# Patient Record
Sex: FEMALE | HISPANIC OR LATINO
[De-identification: names, ages, dates, MRNs, and addresses within clinical notes are randomized per-mention and may not be internally consistent; named-entity substitution may affect disease eponyms.]

---

## 2017-05-15 ENCOUNTER — APPOINTMENT (OUTPATIENT)
Dept: OBGYN | Facility: CLINIC | Age: 52
End: 2017-05-15

## 2017-05-15 VITALS
WEIGHT: 144 LBS | HEIGHT: 60 IN | SYSTOLIC BLOOD PRESSURE: 106 MMHG | DIASTOLIC BLOOD PRESSURE: 69 MMHG | BODY MASS INDEX: 28.27 KG/M2

## 2017-05-15 DIAGNOSIS — Z82.49 FAMILY HISTORY OF ISCHEMIC HEART DISEASE AND OTHER DISEASES OF THE CIRCULATORY SYSTEM: ICD-10-CM

## 2017-05-15 LAB
HCG UR QL: NEGATIVE
QUALITY CONTROL: YES

## 2017-05-16 LAB
CANDIDA VAG CYTO: DETECTED
G VAGINALIS+PREV SP MTYP VAG QL MICRO: DETECTED
HPV HIGH+LOW RISK DNA PNL CVX: NEGATIVE
T VAGINALIS VAG QL WET PREP: NOT DETECTED

## 2017-05-19 LAB
BACTERIA GENITAL AEROBE CULT: ABNORMAL
CYTOLOGY CVX/VAG DOC THIN PREP: NORMAL

## 2018-05-21 ENCOUNTER — APPOINTMENT (OUTPATIENT)
Dept: OBGYN | Facility: CLINIC | Age: 53
End: 2018-05-21
Payer: COMMERCIAL

## 2018-05-21 VITALS
BODY MASS INDEX: 27.71 KG/M2 | SYSTOLIC BLOOD PRESSURE: 90 MMHG | WEIGHT: 141.13 LBS | HEIGHT: 60 IN | DIASTOLIC BLOOD PRESSURE: 60 MMHG

## 2018-05-21 DIAGNOSIS — N76.0 ACUTE VAGINITIS: ICD-10-CM

## 2018-05-21 PROCEDURE — 99396 PREV VISIT EST AGE 40-64: CPT

## 2018-05-21 RX ORDER — TERCONAZOLE 4 MG/G
0.4 CREAM VAGINAL
Qty: 1 | Refills: 1 | Status: COMPLETED | COMMUNITY
Start: 2017-05-15 | End: 2018-05-21

## 2018-05-22 LAB — HPV HIGH+LOW RISK DNA PNL CVX: NOT DETECTED

## 2018-05-29 LAB — CYTOLOGY CVX/VAG DOC THIN PREP: NORMAL

## 2019-04-03 ENCOUNTER — OTHER (OUTPATIENT)
Age: 54
End: 2019-04-03

## 2019-05-21 ENCOUNTER — CHART COPY (OUTPATIENT)
Age: 54
End: 2019-05-21

## 2019-06-24 ENCOUNTER — APPOINTMENT (OUTPATIENT)
Dept: OBGYN | Facility: CLINIC | Age: 54
End: 2019-06-24
Payer: COMMERCIAL

## 2019-06-24 VITALS
SYSTOLIC BLOOD PRESSURE: 104 MMHG | HEART RATE: 58 BPM | BODY MASS INDEX: 28.86 KG/M2 | HEIGHT: 60 IN | DIASTOLIC BLOOD PRESSURE: 61 MMHG | WEIGHT: 147 LBS

## 2019-06-24 PROCEDURE — 99396 PREV VISIT EST AGE 40-64: CPT

## 2019-06-24 RX ORDER — ESTRADIOL 0.1 MG/G
0.1 CREAM VAGINAL
Qty: 1 | Refills: 3 | Status: ACTIVE | COMMUNITY
Start: 2019-06-24 | End: 1900-01-01

## 2019-06-24 NOTE — PHYSICAL EXAM
[Awake] : awake [Alert] : alert [Acute Distress] : no acute distress [Thyroid Nodule] : no thyroid nodule [LAD] : no lymphadenopathy [Goiter] : no goiter [Mass] : no breast mass [Nipple Discharge] : no nipple discharge [Axillary LAD] : no axillary lymphadenopathy [Tender] : non tender [Soft] : soft [Distended] : not distended [Oriented x3] : oriented to person, place, and time [H/Smegaly] : no hepatosplenomegaly [Flat Affect] : affect not flat [Depressed Mood] : not depressed [Normal] : cervix [No Bleeding] : there was no active vaginal bleeding [Anteversion] : anteverted [Tenderness] : nontender [RRR, No Murmurs] : RRR, no murmurs [Uterine Adnexae] : were not tender and not enlarged [CTAB] : CTAB

## 2019-06-24 NOTE — CHIEF COMPLAINT
[Annual Visit] : annual visit [FreeTextEntry1] : 54YO P2 LMP 1/1/19  11/3/18 presents for checkup with occasional sweats. No other complaints except vaginal dryness.

## 2019-06-24 NOTE — HISTORY OF PRESENT ILLNESS
[1 Year Ago] : 1 year ago [Good] : being in good health [Healthy Diet] : a healthy diet [Regular Exercise] : regular exercise [Last Mammogram ___] : Last Mammogram was [unfilled] [Last Colonoscopy ___] : Last colonoscopy [unfilled] [Last Pap ___] : Last cervical pap smear was [unfilled] [Perimenopausal] : is perimenopausal [Pregnancy History] : pregnancy history: [Up to Date] : up to date with ~his/her~ immunizations [Menstrual Problems] : reports normal menses [Weight Concerns] : no concerns with her weight

## 2019-06-26 LAB — HPV HIGH+LOW RISK DNA PNL CVX: NOT DETECTED

## 2019-06-28 LAB — CYTOLOGY CVX/VAG DOC THIN PREP: NORMAL

## 2020-05-07 ENCOUNTER — TRANSCRIPTION ENCOUNTER (OUTPATIENT)
Age: 55
End: 2020-05-07

## 2020-11-06 ENCOUNTER — TRANSCRIPTION ENCOUNTER (OUTPATIENT)
Age: 55
End: 2020-11-06

## 2020-12-07 ENCOUNTER — TRANSCRIPTION ENCOUNTER (OUTPATIENT)
Age: 55
End: 2020-12-07

## 2020-12-11 ENCOUNTER — APPOINTMENT (OUTPATIENT)
Dept: OBGYN | Facility: CLINIC | Age: 55
End: 2020-12-11
Payer: COMMERCIAL

## 2020-12-11 VITALS
BODY MASS INDEX: 27.68 KG/M2 | HEIGHT: 60 IN | DIASTOLIC BLOOD PRESSURE: 57 MMHG | WEIGHT: 141 LBS | TEMPERATURE: 97.7 F | HEART RATE: 56 BPM | SYSTOLIC BLOOD PRESSURE: 101 MMHG

## 2020-12-11 DIAGNOSIS — N39.0 URINARY TRACT INFECTION, SITE NOT SPECIFIED: ICD-10-CM

## 2020-12-11 DIAGNOSIS — Z87.42 PERSONAL HISTORY OF OTHER DISEASES OF THE FEMALE GENITAL TRACT: ICD-10-CM

## 2020-12-11 PROCEDURE — 99396 PREV VISIT EST AGE 40-64: CPT

## 2020-12-11 PROCEDURE — 99072 ADDL SUPL MATRL&STAF TM PHE: CPT

## 2020-12-11 NOTE — HISTORY OF PRESENT ILLNESS
[Patient reported mammogram was normal] : Patient reported mammogram was normal [Patient reported breast sonogram was normal] : Patient reported breast sonogram was normal [Patient reported PAP Smear was normal] : Patient reported PAP Smear was normal [Patient reported colonoscopy was normal] : Patient reported colonoscopy was normal [FreeTextEntry1] : 56YO P2 LMP 1/19 had 1 episode of spotting over last year and a half, none since, noting flashing and dry vagina. [Mammogramdate] : 9/20 [BreastSonogramDate] : 9/20 [PapSmeardate] : 7/19 [ColonoscopyDate] : 2017

## 2020-12-14 ENCOUNTER — TRANSCRIPTION ENCOUNTER (OUTPATIENT)
Age: 55
End: 2020-12-14

## 2020-12-14 LAB — HPV HIGH+LOW RISK DNA PNL CVX: NOT DETECTED

## 2020-12-18 LAB — CYTOLOGY CVX/VAG DOC THIN PREP: ABNORMAL

## 2021-01-27 ENCOUNTER — TRANSCRIPTION ENCOUNTER (OUTPATIENT)
Age: 56
End: 2021-01-27

## 2021-02-11 ENCOUNTER — NON-APPOINTMENT (OUTPATIENT)
Age: 56
End: 2021-02-11

## 2021-04-26 ENCOUNTER — APPOINTMENT (OUTPATIENT)
Dept: OBGYN | Facility: CLINIC | Age: 56
End: 2021-04-26

## 2021-04-26 LAB
BILIRUB UR QL STRIP: NORMAL
CLARITY UR: NORMAL
COLLECTION METHOD: NORMAL
GLUCOSE UR-MCNC: NORMAL
HCG UR QL: 0.2 EU/DL
HGB UR QL STRIP.AUTO: ABNORMAL
KETONES UR-MCNC: NORMAL
LEUKOCYTE ESTERASE UR QL STRIP: ABNORMAL
NITRITE UR QL STRIP: NEGATIVE
PH UR STRIP: 7
PROT UR STRIP-MCNC: NEGATIVE
SP GR UR STRIP: 1.01

## 2021-04-30 LAB — BACTERIA UR CULT: ABNORMAL

## 2021-12-23 ENCOUNTER — APPOINTMENT (OUTPATIENT)
Dept: OBGYN | Facility: CLINIC | Age: 56
End: 2021-12-23
Payer: COMMERCIAL

## 2021-12-23 VITALS
HEIGHT: 60 IN | WEIGHT: 146 LBS | SYSTOLIC BLOOD PRESSURE: 102 MMHG | BODY MASS INDEX: 28.66 KG/M2 | DIASTOLIC BLOOD PRESSURE: 68 MMHG

## 2021-12-23 DIAGNOSIS — N95.1 MENOPAUSAL AND FEMALE CLIMACTERIC STATES: ICD-10-CM

## 2021-12-23 DIAGNOSIS — R39.9 UNSPECIFIED SYMPTOMS AND SIGNS INVOLVING THE GENITOURINARY SYSTEM: ICD-10-CM

## 2021-12-23 PROCEDURE — 99396 PREV VISIT EST AGE 40-64: CPT

## 2021-12-23 RX ORDER — NITROFURANTOIN (MONOHYDRATE/MACROCRYSTALS) 25; 75 MG/1; MG/1
100 CAPSULE ORAL
Qty: 14 | Refills: 0 | Status: COMPLETED | COMMUNITY
Start: 2021-02-11 | End: 2021-12-23

## 2021-12-23 NOTE — HISTORY OF PRESENT ILLNESS
[Patient reported mammogram was normal] : Patient reported mammogram was normal [Patient reported breast sonogram was normal] : Patient reported breast sonogram was normal [Patient reported PAP Smear was normal] : Patient reported PAP Smear was normal [FreeTextEntry1] : 57YO P2 LMP 1/2019 noting finger arthritis, worse in summer, o/w no complaints. [Mammogramdate] : 11/21 [BreastSonogramDate] : 11/21 [PapSmeardate] : 12/20 [ColonoscopyDate] : 11/21

## 2021-12-27 LAB — HPV HIGH+LOW RISK DNA PNL CVX: NOT DETECTED

## 2022-01-03 LAB — CYTOLOGY CVX/VAG DOC THIN PREP: NORMAL

## 2022-12-30 ENCOUNTER — EMERGENCY (EMERGENCY)
Facility: HOSPITAL | Age: 57
LOS: 0 days | Discharge: HOME | End: 2022-12-30
Attending: EMERGENCY MEDICINE
Payer: COMMERCIAL

## 2022-12-30 VITALS
OXYGEN SATURATION: 97 % | HEART RATE: 64 BPM | TEMPERATURE: 98 F | SYSTOLIC BLOOD PRESSURE: 114 MMHG | RESPIRATION RATE: 14 BRPM | DIASTOLIC BLOOD PRESSURE: 58 MMHG

## 2022-12-30 VITALS — WEIGHT: 151.02 LBS | HEIGHT: 62 IN

## 2022-12-30 DIAGNOSIS — N28.1 CYST OF KIDNEY, ACQUIRED: ICD-10-CM

## 2022-12-30 DIAGNOSIS — K52.9 NONINFECTIVE GASTROENTERITIS AND COLITIS, UNSPECIFIED: ICD-10-CM

## 2022-12-30 DIAGNOSIS — R10.9 UNSPECIFIED ABDOMINAL PAIN: ICD-10-CM

## 2022-12-30 DIAGNOSIS — N85.9 NONINFLAMMATORY DISORDER OF UTERUS, UNSPECIFIED: ICD-10-CM

## 2022-12-30 DIAGNOSIS — R19.7 DIARRHEA, UNSPECIFIED: ICD-10-CM

## 2022-12-30 DIAGNOSIS — R14.0 ABDOMINAL DISTENSION (GASEOUS): ICD-10-CM

## 2022-12-30 DIAGNOSIS — K76.89 OTHER SPECIFIED DISEASES OF LIVER: ICD-10-CM

## 2022-12-30 LAB
ALBUMIN SERPL ELPH-MCNC: 4.3 G/DL — SIGNIFICANT CHANGE UP (ref 3.3–5)
ALP SERPL-CCNC: 143 U/L — HIGH (ref 40–120)
ALT FLD-CCNC: 37 U/L — SIGNIFICANT CHANGE UP (ref 12–78)
ANION GAP SERPL CALC-SCNC: 6 MMOL/L — SIGNIFICANT CHANGE UP (ref 5–17)
AST SERPL-CCNC: 28 U/L — SIGNIFICANT CHANGE UP (ref 15–37)
BASOPHILS # BLD AUTO: 0.02 K/UL — SIGNIFICANT CHANGE UP (ref 0–0.2)
BASOPHILS NFR BLD AUTO: 0.2 % — SIGNIFICANT CHANGE UP (ref 0–2)
BILIRUB SERPL-MCNC: 0.6 MG/DL — SIGNIFICANT CHANGE UP (ref 0.2–1.2)
BUN SERPL-MCNC: 16 MG/DL — SIGNIFICANT CHANGE UP (ref 7–23)
CALCIUM SERPL-MCNC: 9.9 MG/DL — SIGNIFICANT CHANGE UP (ref 8.5–10.1)
CHLORIDE SERPL-SCNC: 104 MMOL/L — SIGNIFICANT CHANGE UP (ref 96–108)
CO2 SERPL-SCNC: 29 MMOL/L — SIGNIFICANT CHANGE UP (ref 22–31)
CREAT SERPL-MCNC: 0.7 MG/DL — SIGNIFICANT CHANGE UP (ref 0.5–1.3)
EGFR: 101 ML/MIN/1.73M2 — SIGNIFICANT CHANGE UP
EOSINOPHIL # BLD AUTO: 0.01 K/UL — SIGNIFICANT CHANGE UP (ref 0–0.5)
EOSINOPHIL NFR BLD AUTO: 0.1 % — SIGNIFICANT CHANGE UP (ref 0–6)
GLUCOSE SERPL-MCNC: 116 MG/DL — HIGH (ref 70–99)
HCT VFR BLD CALC: 44.5 % — SIGNIFICANT CHANGE UP (ref 34.5–45)
HGB BLD-MCNC: 14.9 G/DL — SIGNIFICANT CHANGE UP (ref 11.5–15.5)
IMM GRANULOCYTES NFR BLD AUTO: 0.5 % — SIGNIFICANT CHANGE UP (ref 0–0.9)
LIDOCAIN IGE QN: 103 U/L — SIGNIFICANT CHANGE UP (ref 73–393)
LYMPHOCYTES # BLD AUTO: 1.03 K/UL — SIGNIFICANT CHANGE UP (ref 1–3.3)
LYMPHOCYTES # BLD AUTO: 7.8 % — LOW (ref 13–44)
MCHC RBC-ENTMCNC: 29.9 PG — SIGNIFICANT CHANGE UP (ref 27–34)
MCHC RBC-ENTMCNC: 33.5 GM/DL — SIGNIFICANT CHANGE UP (ref 32–36)
MCV RBC AUTO: 89.4 FL — SIGNIFICANT CHANGE UP (ref 80–100)
MONOCYTES # BLD AUTO: 0.28 K/UL — SIGNIFICANT CHANGE UP (ref 0–0.9)
MONOCYTES NFR BLD AUTO: 2.1 % — SIGNIFICANT CHANGE UP (ref 2–14)
NEUTROPHILS # BLD AUTO: 11.77 K/UL — HIGH (ref 1.8–7.4)
NEUTROPHILS NFR BLD AUTO: 89.3 % — HIGH (ref 43–77)
PLATELET # BLD AUTO: 309 K/UL — SIGNIFICANT CHANGE UP (ref 150–400)
POTASSIUM SERPL-MCNC: 3.8 MMOL/L — SIGNIFICANT CHANGE UP (ref 3.5–5.3)
POTASSIUM SERPL-SCNC: 3.8 MMOL/L — SIGNIFICANT CHANGE UP (ref 3.5–5.3)
PROT SERPL-MCNC: 8.9 GM/DL — HIGH (ref 6–8.3)
RBC # BLD: 4.98 M/UL — SIGNIFICANT CHANGE UP (ref 3.8–5.2)
RBC # FLD: 13.2 % — SIGNIFICANT CHANGE UP (ref 10.3–14.5)
SODIUM SERPL-SCNC: 139 MMOL/L — SIGNIFICANT CHANGE UP (ref 135–145)
WBC # BLD: 13.17 K/UL — HIGH (ref 3.8–10.5)
WBC # FLD AUTO: 13.17 K/UL — HIGH (ref 3.8–10.5)

## 2022-12-30 PROCEDURE — 74177 CT ABD & PELVIS W/CONTRAST: CPT | Mod: MA

## 2022-12-30 PROCEDURE — 96374 THER/PROPH/DIAG INJ IV PUSH: CPT

## 2022-12-30 PROCEDURE — 83690 ASSAY OF LIPASE: CPT

## 2022-12-30 PROCEDURE — 85025 COMPLETE CBC W/AUTO DIFF WBC: CPT

## 2022-12-30 PROCEDURE — 80053 COMPREHEN METABOLIC PANEL: CPT

## 2022-12-30 PROCEDURE — 36415 COLL VENOUS BLD VENIPUNCTURE: CPT

## 2022-12-30 PROCEDURE — 99285 EMERGENCY DEPT VISIT HI MDM: CPT

## 2022-12-30 PROCEDURE — 99284 EMERGENCY DEPT VISIT MOD MDM: CPT | Mod: 25

## 2022-12-30 PROCEDURE — 74177 CT ABD & PELVIS W/CONTRAST: CPT | Mod: 26,MA

## 2022-12-30 RX ORDER — ONDANSETRON 8 MG/1
4 TABLET, FILM COATED ORAL ONCE
Refills: 0 | Status: COMPLETED | OUTPATIENT
Start: 2022-12-30 | End: 2022-12-30

## 2022-12-30 RX ORDER — METRONIDAZOLE 500 MG
500 TABLET ORAL ONCE
Refills: 0 | Status: COMPLETED | OUTPATIENT
Start: 2022-12-30 | End: 2022-12-30

## 2022-12-30 RX ORDER — METRONIDAZOLE 500 MG
1 TABLET ORAL
Qty: 21 | Refills: 0
Start: 2022-12-30 | End: 2023-01-05

## 2022-12-30 RX ORDER — CIPROFLOXACIN LACTATE 400MG/40ML
1 VIAL (ML) INTRAVENOUS
Qty: 14 | Refills: 0
Start: 2022-12-30 | End: 2023-01-05

## 2022-12-30 RX ORDER — CIPROFLOXACIN LACTATE 400MG/40ML
500 VIAL (ML) INTRAVENOUS ONCE
Refills: 0 | Status: COMPLETED | OUTPATIENT
Start: 2022-12-30 | End: 2022-12-30

## 2022-12-30 RX ORDER — SODIUM CHLORIDE 9 MG/ML
1000 INJECTION INTRAMUSCULAR; INTRAVENOUS; SUBCUTANEOUS ONCE
Refills: 0 | Status: COMPLETED | OUTPATIENT
Start: 2022-12-30 | End: 2022-12-30

## 2022-12-30 RX ADMIN — SODIUM CHLORIDE 1000 MILLILITER(S): 9 INJECTION INTRAMUSCULAR; INTRAVENOUS; SUBCUTANEOUS at 16:01

## 2022-12-30 RX ADMIN — ONDANSETRON 4 MILLIGRAM(S): 8 TABLET, FILM COATED ORAL at 16:01

## 2022-12-30 RX ADMIN — Medication 500 MILLIGRAM(S): at 18:50

## 2022-12-30 RX ADMIN — Medication 500 MILLIGRAM(S): at 18:51

## 2022-12-30 NOTE — ED STATDOCS - PROGRESS NOTE DETAILS
pt reeval and states she feels better and will fu with GI and PMD, pt educated on diet and to take antibiotics as directed. pt well appearing on dc and agrees with plan. -Julia Smith PA-C

## 2022-12-30 NOTE — ED STATDOCS - ATTENDING APP SHARED VISIT CONTRIBUTION OF CARE
I, Dony Han MD, personally saw the patient with ACP.  I have personally performed a face to face diagnostic evaluation on this patient.  I have reviewed the ACP note and agree with the history, exam, and plan of care, except as noted.   The initial assessment was performed by myself and then the patient was handed off to the ACP. The patient was followed and re-evaluated by the ACP. All labs, imaging and procedures were evaluated and performed by the ACP and I was available for consultation if any questions in the patients care came up.

## 2022-12-30 NOTE — ED STATDOCS - NS_ ATTENDINGSCRIBEDETAILS _ED_A_ED_FT
I, Dony Han MD,  performed the initial face to face bedside interview with this patient regarding history of present illness, review of symptoms and relevant past medical, social and family history.  I completed an independent physical examination.  I was the initial provider who evaluated this patient.   I personally saw the patient and performed a substantive portion of the visit including all aspects of the medical decision making.  The history, relevant review of systems, past medical and surgical history, medical decision making, and physical examination was documented by the scribe in my presence and I attest to the accuracy of the documentation.

## 2022-12-30 NOTE — ED STATDOCS - PATIENT PORTAL LINK FT
You can access the FollowMyHealth Patient Portal offered by [facility] by registering at the following website: [email or web address]. By joining [employer]’s FollowMyHealth portal, you will also be able to view your health information using other applications (apps) compatible with our system.

## 2022-12-30 NOTE — ED STATDOCS - CLINICAL SUMMARY MEDICAL DECISION MAKING FREE TEXT BOX
58 y/o F with N/D, and abd cramping. Has had similar episodes before, but did not f/u with someone about it. Exam is non focal. will r/o diverticulitis and reassess.

## 2022-12-30 NOTE — ED STATDOCS - OBJECTIVE STATEMENT
56 y/o F with no pertinent pMHx p/w abd pain and N/D since 10 AM. pt states having intermittent abd bloating and cramps. Had a few episodes of diarrhea. States having a prior episode that was more severe and is exacerbated by drinking water. Last had colonoscopy 2 months ago. Denies smoking. Endorses some ETOH intake this week. NKDA.

## 2022-12-30 NOTE — ED STATDOCS - NS ED ROS FT
Constitutional: No fever or chills  Eyes: No visual changes  HEENT: No throat pain  CV: No chest pain  Resp: No SOB no cough  GI: +abd pain, +N/V  : No dysuria  MSK: No musculoskeletal pain  Skin: No rash  Neuro: No headache

## 2022-12-30 NOTE — ED STATDOCS - CARE PROVIDER_API CALL
Jovanny Ellis)  Internal Medicine  [address] [place] [address]  Phone: [phone]  Follow Up Time: Urgent

## 2023-02-13 ENCOUNTER — APPOINTMENT (OUTPATIENT)
Dept: OBGYN | Facility: CLINIC | Age: 58
End: 2023-02-13
Payer: COMMERCIAL

## 2023-02-13 VITALS
BODY MASS INDEX: 27.48 KG/M2 | WEIGHT: 140 LBS | DIASTOLIC BLOOD PRESSURE: 76 MMHG | HEIGHT: 60 IN | SYSTOLIC BLOOD PRESSURE: 121 MMHG

## 2023-02-13 DIAGNOSIS — Z01.419 ENCOUNTER FOR GYNECOLOGICAL EXAMINATION (GENERAL) (ROUTINE) W/OUT ABNORMAL FINDINGS: ICD-10-CM

## 2023-02-13 PROCEDURE — 99396 PREV VISIT EST AGE 40-64: CPT

## 2023-02-13 NOTE — HISTORY OF PRESENT ILLNESS
[Patient reported mammogram was normal] : Patient reported mammogram was normal [Patient reported breast sonogram was normal] : Patient reported breast sonogram was normal [Patient reported PAP Smear was normal] : Patient reported PAP Smear was normal [Patient reported bone density results were normal] : Patient reported bone density results were normal [Patient reported colonoscopy was normal] : Patient reported colonoscopy was normal [FreeTextEntry1] : 58YO P2 LMP 01/2019, finger arthritis improved, hospitalized with colitis around [place] years. No other complaints. [Mammogramdate] : 12/22 [BreastSonogramDate] : 12/22 [PapSmeardate] : 11/21 [BoneDensityDate] : 12/22 [ColonoscopyDate] : 2023

## 2023-02-14 LAB — HPV HIGH+LOW RISK DNA PNL CVX: NOT DETECTED

## 2023-02-16 LAB — CYTOLOGY CVX/VAG DOC THIN PREP: NORMAL

## 2023-06-26 ENCOUNTER — NON-APPOINTMENT (OUTPATIENT)
Age: 58
End: 2023-06-26

## 2023-07-07 ENCOUNTER — NON-APPOINTMENT (OUTPATIENT)
Age: 58
End: 2023-07-07

## 2023-07-11 LAB — BACTERIA UR CULT: ABNORMAL

## 2023-07-17 ENCOUNTER — NON-APPOINTMENT (OUTPATIENT)
Age: 58
End: 2023-07-17

## 2023-07-20 ENCOUNTER — RX RENEWAL (OUTPATIENT)
Age: 58
End: 2023-07-20

## 2023-11-21 DIAGNOSIS — R92.2 INCONCLUSIVE MAMMOGRAM: ICD-10-CM

## 2023-11-21 DIAGNOSIS — R92.30 INCONCLUSIVE MAMMOGRAM: ICD-10-CM

## 2023-12-05 PROBLEM — Z78.9 OTHER SPECIFIED HEALTH STATUS: Chronic | Status: ACTIVE | Noted: 2022-12-30

## 2024-02-16 ENCOUNTER — APPOINTMENT (OUTPATIENT)
Dept: OBGYN | Facility: CLINIC | Age: 59
End: 2024-02-16
Payer: COMMERCIAL

## 2024-02-16 VITALS
HEIGHT: 60 IN | BODY MASS INDEX: 27.88 KG/M2 | WEIGHT: 142 LBS | SYSTOLIC BLOOD PRESSURE: 125 MMHG | DIASTOLIC BLOOD PRESSURE: 79 MMHG

## 2024-02-16 DIAGNOSIS — R39.9 UNSPECIFIED SYMPTOMS AND SIGNS INVOLVING THE GENITOURINARY SYSTEM: ICD-10-CM

## 2024-02-16 DIAGNOSIS — Z01.419 ENCOUNTER FOR GYNECOLOGICAL EXAMINATION (GENERAL) (ROUTINE) W/OUT ABNORMAL FINDINGS: ICD-10-CM

## 2024-02-16 DIAGNOSIS — M19.90 UNSPECIFIED OSTEOARTHRITIS, UNSPECIFIED SITE: ICD-10-CM

## 2024-02-16 PROCEDURE — 99396 PREV VISIT EST AGE 40-64: CPT

## 2024-02-16 RX ORDER — AMOXICILLIN AND CLAVULANATE POTASSIUM 500; 125 MG/1; MG/1
500-125 TABLET, FILM COATED ORAL
Qty: 14 | Refills: 0 | Status: COMPLETED | COMMUNITY
Start: 2023-07-11 | End: 2024-02-16

## 2024-02-16 RX ORDER — NITROFURANTOIN (MONOHYDRATE/MACROCRYSTALS) 25; 75 MG/1; MG/1
100 CAPSULE ORAL TWICE DAILY
Qty: 10 | Refills: 0 | Status: COMPLETED | COMMUNITY
Start: 2023-06-26 | End: 2024-02-16

## 2024-02-16 NOTE — HISTORY OF PRESENT ILLNESS
[Patient reported mammogram was normal] : Patient reported mammogram was normal [Patient reported PAP Smear was normal] : Patient reported PAP Smear was normal [Patient reported bone density results were normal] : Patient reported bone density results were normal [Patient reported colonoscopy was normal] : Patient reported colonoscopy was normal [FreeTextEntry1] : 57YO P2 LMP 1/19 noting early morning bilat hip pain that improves throughout the day, no other complaints. [Mammogramdate] : 12/23 [PapSmeardate] : 2023 [BoneDensityDate] : 12/22 [ColonoscopyDate] : 2022

## 2024-02-22 LAB
ANA SER IF-ACNC: NEGATIVE
HPV HIGH+LOW RISK DNA PNL CVX: NOT DETECTED
RF IGA SER-ACNC: <5 U
RF IGG SER-ACNC: <5 U
RF IGM SER-ACNC: <5 U

## 2024-02-23 LAB — CYTOLOGY CVX/VAG DOC THIN PREP: NORMAL

## 2024-03-03 ENCOUNTER — TRANSCRIPTION ENCOUNTER (OUTPATIENT)
Age: 59
End: 2024-03-03

## 2024-11-13 ENCOUNTER — NON-APPOINTMENT (OUTPATIENT)
Age: 59
End: 2024-11-13

## 2024-12-03 DIAGNOSIS — Z12.39 ENCOUNTER FOR OTHER SCREENING FOR MALIGNANT NEOPLASM OF BREAST: ICD-10-CM

## 2025-03-04 ENCOUNTER — NON-APPOINTMENT (OUTPATIENT)
Age: 60
End: 2025-03-04

## 2025-03-04 ENCOUNTER — APPOINTMENT (OUTPATIENT)
Dept: OBGYN | Facility: CLINIC | Age: 60
End: 2025-03-04
Payer: COMMERCIAL

## 2025-03-04 VITALS — BODY MASS INDEX: 28.51 KG/M2 | SYSTOLIC BLOOD PRESSURE: 104 MMHG | WEIGHT: 146 LBS | DIASTOLIC BLOOD PRESSURE: 64 MMHG

## 2025-03-04 DIAGNOSIS — Z80.3 FAMILY HISTORY OF MALIGNANT NEOPLASM OF BREAST: ICD-10-CM

## 2025-03-04 DIAGNOSIS — Z01.419 ENCOUNTER FOR GYNECOLOGICAL EXAMINATION (GENERAL) (ROUTINE) W/OUT ABNORMAL FINDINGS: ICD-10-CM

## 2025-03-04 PROCEDURE — 99396 PREV VISIT EST AGE 40-64: CPT

## 2025-03-04 PROCEDURE — G0444 DEPRESSION SCREEN ANNUAL: CPT | Mod: 59

## 2025-03-07 LAB — HPV HIGH+LOW RISK DNA PNL CVX: NOT DETECTED

## 2025-03-11 LAB — CYTOLOGY CVX/VAG DOC THIN PREP: NORMAL
